# Patient Record
Sex: FEMALE | Race: WHITE | Employment: FULL TIME | URBAN - METROPOLITAN AREA
[De-identification: names, ages, dates, MRNs, and addresses within clinical notes are randomized per-mention and may not be internally consistent; named-entity substitution may affect disease eponyms.]

---

## 2021-01-06 ENCOUNTER — HOSPITAL ENCOUNTER (OUTPATIENT)
Dept: PHYSICAL THERAPY | Age: 21
Discharge: HOME | End: 2021-01-06
Payer: COMMERCIAL

## 2021-01-06 PROCEDURE — 97161 PT EVAL LOW COMPLEX 20 MIN: CPT

## 2021-01-06 PROCEDURE — 97530 THERAPEUTIC ACTIVITIES: CPT

## 2021-01-06 NOTE — PROGRESS NOTES
[facility] [facility] PHYSICAL THERAPY AT [address], [address], [address] [place] [address] - Phone: [phone]  Fax: [phone] / [address] [facility]  Patient Name: Stephanei Rollins : 2000   Medical   Diagnosis: Pelvic floor dysfunction [M62.89] Treatment Diagnosis: Pelvic floor dysfunction, dyspareunia   Onset Date: 1 year ago     Referral Source: Modesto Anderson NP Start of Care [facility]): 2021   Prior Hospitalization: See medical history Provider #: [id number]   Prior Level of Function: No reports of pelvic pain   Comorbidities: L hip pain, LBP, depression   Medications: Verified on Patient Summary List   The Plan of Care and following information is based on the information from the initial evaluation.   ==================================================================================  Assessment / key information:  Patient is a nulliparous 24 y.o. female who presents to [facility] with diagnosis of Pelvic floor dysfunction [M62.89]. Patient reports onset of pain with sex just over a year ago. Significant history of L hip pain which refers to her low back. BMs 3xweek, 10-15 voids per day. Upon objective evaluation, patient demonstrates chest-dominant breathing pattern. Lumbar and hip ROM WNL. Difficulty with L SLS due to pain in L hip, with genu valgus and (+) Trendelenberg. Hip Strength: R WNL, L 3+/5 adduction and pain with abduction. There was moderate tenderness to palpation over layer 1 PFM externally and obturator internus L>R internally. Strength of pelvic floor muscles was impaired with PERF 2+/10/3/10, with significant difficulty relaxing after maximal contraction. Biofeedback deferred to next visit. Patient was instructed in initial pelvic floor relaxation strategies, diaphragmatic breathing, and filling out voiding diary.  Patient scored 17 on FOTO/Pain indicating decreased quality of life. Functional limitations include activity tolerance, limited tolerance for women's hygiene products and well exams. Patient can benefit from PT to decrease pain, Increase coordination and functional abilities to improve quality of life.  ==================================================================================  Eval Complexity: History: MEDIUM  Complexity : 1-2 comorbidities / personal factors will impact the outcome/ POC Exam:HIGH Complexity : 4+ Standardized tests and measures addressing body structure, function, activity limitation and / or participation in recreation  Presentation: LOW Complexity : Stable, uncomplicated  Clinical Decision Making:LOW Complexity : FOTO score of 75-100Overall Complexity:LOW   Problem List: Pelvic pain/dysfunction, Decreased pelvic floor mm awareness, Decreased pelvic floor mm strength, Use of accessory muscles, Improper voiding habits, Hypertonus of pelvic floor and Urinary urgency   Treatment Plan may include any combination of the following: Therapeutic exercise, Urge suppression techniques, Neuromuscular re-education, Manual therapy, Physical agent/modality and Patient education  Patient / Family readiness to learn indicated by: asking questions, trying to perform skills and interest  Persons(s) to be included in education: patient (P), patient's spouse  Barriers to Learning/Limitations: None  Measures taken:    Patient Goal (s): \"painless sex\"   Patient self reported health status: good  Rehabilitation Potential: excellent  Short Term Goals: To be accomplished in 4 weeks:     1) Patient performing HEP consistently. 2) Patient will report > or = 30% improvement in pain with insertional ADLs. [place] 3) Assess sEMG biofeedback for resting and working tone of pelvic floor muscles and set goals accordingly. 4) Assess L hip further as a contributor to pelvic pain and set goals accordingly. (To be done at visit 2).    5) Patient to report voiding habits as WNL (5-8 voids per day and BM daily or every other day). Long Term Goals: To be accomplished in 8-12 weeks:   1) Patient independent in HEP. 2) Patient will report > or = 60% improvement in pain with insertional ADLs. Tom Copeland 3) Decrease score on FOTO/Pain to < or = 9 to indicate improved quality of life. 4) Patient to demonstrate WNL resting tone of PFM for increased activity tolerance and decreased pain. 5) improve L hip strength to > or = 4/5 for walking, standing as a . Frequency / Duration:   Patient to be seen  1-2  times per week for 8-12  weeks:  Patient / Caregiver education and instruction: Proper Voiding Habits, Pain Management, Exercises and Bladder Retraining  Therapist Signature: Tino Epstein PT, DPT, MTC, CMTPT Date: 4/9/8588   Certification Period: NA Time: 1:15 PM   ==================================================================================  I certify that the above Physical Therapy Services are being furnished while the patient is under my care. I agree with the treatment plan and certify that this therapy is necessary. Physician Signature:        Date:       Time:     Please sign and return to [facility] or you may fax the signed copy to [phone]. Thank you.

## 2021-01-06 NOTE — PROGRESS NOTES
PHYSICAL THERAPY - DAILY TREATMENT NOTE     Patient Name: Kavitha Hendricks        Date: 2021  : 2000   YES Patient  Verified  Visit #:   1     Insurance: Payor: Aj [employer] / Plan: [facility] PPO / Product Type: PPO /      In time: 2340 Out time: 1255   Total Treatment Time: 60     Medicare/[facility] Time Tracking (below)   Total Timed Codes (min):   1:1 Treatment Time:       TREATMENT AREA =  Pelvic floor dysfunction [M62.89]    SUBJECTIVE    Pain Level (on 0 to 10 scale):  0  / 10   Medication Changes/New allergies or changes in medical history, any new surgeries or procedures? NO    If yes, update Summary List   Subjective Functional Status/Changes:  []  No changes reported       Functional improvements: see POC  Functional impairments: see POC         OBJECTIVE      25 min Therapeutic Activity: [x]  See flow sheet   Rationale:      improve coordination and increase proprioception to improve the patients ability to perform ADLs as a  and tolerate insertional activities. Billed With/As:   [] TE   [x] TA   [] Neuro   [] Self Care Patient Education: [x] Review HEP    [] Progressed/Changed HEP based on:   [] positioning   [] body mechanics   [] transfers   [] heat/ice application    [] other:        Other Objective/Functional Measures:    See POC. Post Treatment Pain Level (on 0 to 10) scale:   0  / 10     ASSESSMENT    Assessment/Changes in Function:     See POC. []  See Progress Note/Recertification   Patient will continue to benefit from skilled PT services to modify and progress therapeutic interventions, address ROM deficits, address strength deficits, analyze and address soft tissue restrictions, analyze and cue movement patterns, analyze and modify body mechanics/ergonomics, assess and modify postural abnormalities and instruct in home and community integration to attain remaining goals.       Progress toward goals / Updated goals:    See POC PLAN    [x]  Upgrade activities as tolerated YES Continue plan of care   []  Discharge due to :    []  Other:      Therapist: Karime Elizalde, PT, DPT, MTC, CMTPT    Date: 1/6/2021 Time: 1:34 PM     Future Appointments   Date Time Provider Nadia Aly   1/13/2021 10:15 AM Piotr Amas, PT [facility]   1/20/2021 10:15 AM Piotr Amas, PT [facility]   1/27/2021 10:15 AM Piotr Amas, PT [facility]

## 2021-01-13 ENCOUNTER — HOSPITAL ENCOUNTER (OUTPATIENT)
Dept: PHYSICAL THERAPY | Age: 21
Discharge: HOME | End: 2021-01-13
Payer: COMMERCIAL

## 2021-01-13 PROCEDURE — 97110 THERAPEUTIC EXERCISES: CPT

## 2021-01-13 PROCEDURE — 97530 THERAPEUTIC ACTIVITIES: CPT

## 2021-01-13 PROCEDURE — 97140 MANUAL THERAPY 1/> REGIONS: CPT

## 2021-01-13 NOTE — PROGRESS NOTES
PF DAILY TREATMENT NOTE 3-16    Patient Name: Nino Roy  Date:2021  : 2000  [x]  Patient  Verified  Payor: BLUE CROSS / Plan: [facility] PPO / Product Type: PPO /    In time:1010  Out time:1105  Total Treatment Time (min): 55  Visit #: 2 of     Medicare/BCBS Only   Total Timed Codes (min):   1:1 Treatment Time:       Treatment Area: [x] Pelvic Floor     [] Other:    SUBJECTIVE  Pain Level (0-10 scale): 0  Any medication changes, allergies to medications, adverse drug reactions, diagnosis change, or new procedure performed?: [x] No    [] Yes (see summary sheet for update)  Subjective functional status/changes:   [] No changes reported    Functional improvement: completed 1 workday of bladder diary and found she is not voiding as frequently as previously thought  Functional limitations: sex, urinary leakage (infrequent and just damp/drops), hip pain    OBJECTIVE    15 min Therapeutic Exercise:  [x] See flow sheet   []  Pelvic floor strengthening                 []  Pelvic floor downtraining  []  Quality pelvic floor contractions       []  Relaxation techniques  []  Urge suppression exercises  [x]  Other:L hip strengthening  Rationale: increase ROM and increase strength  to improve the patients ability to tolerate standing and walking for work       25 min Therapeutic Activity:  [x]  See flow sheet    []  Increase Tissue extensibility        []  Assess fiber intake    [x]  Assess voiding habits  []  Assess bowel habits  []  Other:   Rationale: improve coordination  to improve the patients ability to perform ADLs    15 min Manual Therapy:  Manual TPR to L glutes, piriformis, TFL, QL   Rationale: decrease pain, increase ROM, increase tissue extensibility, decrease trigger points and increase postural awareness to improve standing tolerance      With   [x] TE   [x] TA   [] neuro   [x] manual   [] other: Patient Education: [x] Review HEP    [] Progressed/Changed HEP based on: [] positioning   [] body mechanics   [] transfers   [] heat/ice application    [] other:      Other Objective/Functional Measures:   []Biofeedback:    []PERF:       In supine, pain with active hip ABD starting at 30 deg  Post manual, pain free hip ABD in supine to 45 deg. Pain Level (0-10 scale) post treatment: 0    ASSESSMENT/Changes in Function: patient is a good candidate for dry needling. MF TrPs present throughout glutes. Will assess effects on pelvic floor form resolving hip symptoms. Patient will continue to benefit from skilled PT services to modify and progress therapeutic interventions, address functional mobility deficits, address ROM deficits, address strength deficits, analyze and address soft tissue restrictions, analyze and cue movement patterns, analyze and modify body mechanics/ergonomics, assess and modify postural abnormalities and instruct in home and community integration to attain remaining goals.      []  See Plan of Care  []  See Progress Note/Re-certification  []  See Discharge Summary         Progress towards goals / Updated goals:  Compliant with HEP    PLAN  [x]  Upgrade activities as tolerated     [x]  Continue plan of care  []  Update interventions per flow sheet       []  Discharge due to:_  []  Other:_      Crissy Wyatt, PT 1/13/2021  10:21 AM    Future Appointments   Date Time Provider Nadia Aly   1/20/2021 10:15 AM Brian Kaufman PT [place] [id number] [address]   1/27/2021 10:15 AM Brian Kaufman PT [place] [id number] [address]

## 2021-01-20 ENCOUNTER — HOSPITAL ENCOUNTER (OUTPATIENT)
Dept: PHYSICAL THERAPY | Age: 21
Discharge: HOME | End: 2021-01-20
Payer: COMMERCIAL

## 2021-01-20 PROCEDURE — 97530 THERAPEUTIC ACTIVITIES: CPT

## 2021-01-20 PROCEDURE — 97112 NEUROMUSCULAR REEDUCATION: CPT

## 2021-01-20 NOTE — PROGRESS NOTES
PF DAILY TREATMENT NOTE 3-16    Patient Name: Zach Rome  Date:2021  : 2000  [x]  Patient  Verified  Payor: BLUE CROSS / Plan: [facility] PPO / Product Type: PPO /    In time:1015  Out time:1105  Total Treatment Time (min): 50  Visit #: 3 of     Medicare/BCBS Only   Total Timed Codes (min):   1:1 Treatment Time:       Treatment Area: [x] Pelvic Floor     [] Other:    SUBJECTIVE  Pain Level (0-10 scale): 0  Any medication changes, allergies to medications, adverse drug reactions, diagnosis change, or new procedure performed?: [x] No    [] Yes (see summary sheet for update)  Subjective functional status/changes:   [] No changes reported    Functional improvement: some short term relief after manual treatment last visit  Functional limitations:  leakage 2-3 x day, stepping over something, just sitting (haven't noticed a specific position), walking    OBJECTIVE    15 min Therapeutic Activity:  [x]  See flow sheet    []  Increase Tissue extensibility        []  Assess fiber intake    [x]  Assess voiding habits  [x]  Assess bowel habits  []  Other:   Rationale: increase ROM, increase strength, improve coordination and increase proprioception  to improve the patients ability to perform ADLs without urinary leakage      35 min Neuromuscular Re-education:  [x]  See flow sheet    [x]  Pelvic floor strengthening                 [x]  Pelvic floor downtraining  [x]  Quality pelvic floor contractions       [x]  Relaxation techniques  []  Urge suppression exercises  []  Other:  Rationale: increase ROM, increase strength, improve coordination and increase proprioception  to improve the patients ability to perform ADLs without urinary leakage nor pain    With   [] TE   [x] TA   [x] neuro   [] manual   [] other: Patient Education: [x] Review HEP    [] Progressed/Changed HEP based on:   [] positioning   [] body mechanics   [] transfers   [] heat/ice application    [] other:      Other Objective/Functional Measures:   [x]Biofeedback: initial resting tone avg 4 uV, highest contraction 46 uV. With visualization of the chart on Telesis, patient able ot achieve resting tone of 0-2 uV. Patient declined TDN at this time due to additional copay of $50. Will reassess in a few weeks to see need. []PERF:     Added eccentric training for L glute med today: supine with manual resist x 10 reps, then added green Tband for patient to use at home, passively get to hip ABD and then control to midline. Pain Level (0-10 scale) post treatment: 0    ASSESSMENT/Changes in Function: significantly improved awareness of pelvic floor function, urinary control. Good resting tone and control of PFM as per biofeedback. Significantly improved with instruction for diaphragmatic breathing with inhale to prepare and exhale on exertion. (see chart for Telesis session)    Patient will continue to benefit from skilled PT services to modify and progress therapeutic interventions, address functional mobility deficits, address ROM deficits, address strength deficits, analyze and address soft tissue restrictions, analyze and cue movement patterns, analyze and modify body mechanics/ergonomics, assess and modify postural abnormalities and instruct in home and community integration to attain remaining goals.      []  See Plan of Care  []  See Progress Note/Re-certification  []  See Discharge Summary         Progress towards goals / Updated goals:  STG 1, 3, 4 achieved  New goal: patient will be able to perform 3x15 eccentric L hip ABD in supine or standing for hip strengthening    PLAN  [x]  Upgrade activities as tolerated     [x]  Continue plan of care  []  Update interventions per flow sheet       []  Discharge due to:_  []  Other:_      Henok Yuan PT 1/20/2021  10:22 AM    Future Appointments   Date Time Provider Nadia Aly   1/27/2021 10:15 AM Sachi Myers, PT [facility]

## 2021-01-27 ENCOUNTER — APPOINTMENT (OUTPATIENT)
Dept: PHYSICAL THERAPY | Age: 21
End: 2021-01-27
Payer: COMMERCIAL

## 2021-04-09 NOTE — PROGRESS NOTES
[address] [facility] PHYSICAL THERAPY  [address] [facility] [address] , [address] [facility]- Phone: [phone]  Fax: [phone]  []  PROGRESS NOTE  [x]  DISCHARGE SUMMARY  Patient Name: Arthur Kitchen : 2000   Treatment Diagnosis: Pelvic floor dysfunction [M62.89]     Referral Source: Brandon Tanner NP     Date of Initial Visit: 21 Attended Visits: 3 Missed Visits: 1     SUMMARY OF TREATMENT  Therapeutic exercise, Urge suppression techniques, Neuromuscular re-education, Manual therapy, Physical agent/modality and Patient education    CURRENT STATUS  Patient was unable to be formally reassessed secondary to not returning to PT after 21 visit. Goal/Measure of Progress Goal Met? 1. Patient performing HEP consistently. Status at last Eval: dependent Current Status: Unable to be formally reassessed. no   2. Patient will report > or = 30% improvement in pain with insertional ADLs. .       Status at last Eval: n/a Current Status: Unable to be formally reassessed. no   3.                   3) Assess sEMG biofeedback for resting and working tone of pelvic floor muscles and set goals accordingly. 4) Assess L hip further as a contributor to pelvic pain and set goals accordingly. (To be done at visit 2). 5) Patient to report voiding habits as WNL (5-8 voids per day and BM daily or every other day). Status at last Eval: Voiding 10-15 x per day Current Status: Unable to be formally reassessed. no       RECOMMENDATIONS  Discharge PT secondary to pt did not return. If you have any questions/comments please contact us directly at [phone]   Thank you for allowing us to assist in the care of your patient.     Therapist Signature: Emi Jasmine PTA Date: 21     Time: 12:51 PM   NOTE TO PHYSICIAN:  PLEASE COMPLETE THE ORDERS BELOW AND FAX TO   [facility]: [phone]  If you are unable to process this request in 24 hours please contact our office: [phone]    ___ I have read the above report and request that my patient continue as recommended.   ___ I have read the above report and request that my patient continue therapy with the following changes/special instructions:_________________________________________________________   ___ I have read the above report and request that my patient be discharged from therapy.      Physician Signature:        Date:       Time: